# Patient Record
Sex: FEMALE | Race: WHITE | NOT HISPANIC OR LATINO | Employment: FULL TIME | URBAN - METROPOLITAN AREA
[De-identification: names, ages, dates, MRNs, and addresses within clinical notes are randomized per-mention and may not be internally consistent; named-entity substitution may affect disease eponyms.]

---

## 2017-05-29 ENCOUNTER — OFFICE VISIT (OUTPATIENT)
Dept: RETAIL CLINIC | Facility: CLINIC | Age: 37
End: 2017-05-29

## 2017-05-29 VITALS
DIASTOLIC BLOOD PRESSURE: 62 MMHG | OXYGEN SATURATION: 98 % | TEMPERATURE: 99.5 F | SYSTOLIC BLOOD PRESSURE: 112 MMHG | HEART RATE: 82 BPM | RESPIRATION RATE: 18 BRPM

## 2017-05-29 DIAGNOSIS — H10.9 CONJUNCTIVITIS OF RIGHT EYE, UNSPECIFIED CONJUNCTIVITIS TYPE: Primary | ICD-10-CM

## 2017-05-29 PROCEDURE — 99203 OFFICE O/P NEW LOW 30 MIN: CPT | Performed by: NURSE PRACTITIONER

## 2017-05-29 RX ORDER — CITALOPRAM 20 MG/1
20 TABLET ORAL DAILY
COMMUNITY
End: 2019-05-07

## 2017-05-29 RX ORDER — POLYMYXIN B SULFATE AND TRIMETHOPRIM 1; 10000 MG/ML; [USP'U]/ML
SOLUTION OPHTHALMIC
Qty: 1 EACH | Refills: 0 | Status: SHIPPED | OUTPATIENT
Start: 2017-05-29 | End: 2019-05-07

## 2019-01-07 ENCOUNTER — OFFICE VISIT (OUTPATIENT)
Dept: RETAIL CLINIC | Facility: CLINIC | Age: 39
End: 2019-01-07

## 2019-01-07 VITALS
OXYGEN SATURATION: 99 % | SYSTOLIC BLOOD PRESSURE: 125 MMHG | TEMPERATURE: 99.3 F | DIASTOLIC BLOOD PRESSURE: 90 MMHG | HEART RATE: 83 BPM

## 2019-01-07 DIAGNOSIS — J06.9 ACUTE URI: Primary | ICD-10-CM

## 2019-01-07 DIAGNOSIS — J32.9 SINUSITIS, UNSPECIFIED CHRONICITY, UNSPECIFIED LOCATION: ICD-10-CM

## 2019-01-07 PROCEDURE — 99213 OFFICE O/P EST LOW 20 MIN: CPT | Performed by: NURSE PRACTITIONER

## 2019-01-07 RX ORDER — PREDNISONE 20 MG/1
20 TABLET ORAL 2 TIMES DAILY
Qty: 10 TABLET | Refills: 0 | Status: SHIPPED | OUTPATIENT
Start: 2019-01-07 | End: 2019-05-07

## 2019-01-07 RX ORDER — BENZONATATE 100 MG/1
CAPSULE ORAL
Qty: 30 CAPSULE | Refills: 0 | Status: SHIPPED | OUTPATIENT
Start: 2019-01-07 | End: 2019-05-07

## 2019-01-07 RX ORDER — AMOXICILLIN 500 MG/1
500 CAPSULE ORAL 2 TIMES DAILY
Qty: 20 CAPSULE | Refills: 0 | Status: SHIPPED | OUTPATIENT
Start: 2019-01-07 | End: 2019-01-17

## 2019-01-07 NOTE — PATIENT INSTRUCTIONS
Enterovirus D68, Adult  Enterovirus D68 is a common virus that causes a fever, cough, and nasal congestion (upper respiratory infection).  What are the causes?  Enterovirus D68 spreads from person to person through coughing and sneezing. The virus is present in the fluid of an infected person's lungs (upper respiratory secretions). When a sick person coughs or sneezes, particles get released into the air. You can get infected by breathing in those particles. The virus may also be on any surface where these particles land. You can get infected by touching that surface and then touching your nose or mouth.  What increases the risk?  This condition is more likely to develop in:  · People who have a chronic disease, such as chronic obstructive pulmonary disease (COPD), asthma, congestive heart failure, cystic fibrosis, or diabetes.  · People who have a weakened immune system (are immunocompromised).    What are the signs or symptoms?  For most adults, symptoms of this condition are mild. Symptoms include:  · Fever.  · Nasal congestion.  · Sneezing.  · Muscle aches.  · Cough.  · Wheezing.  · Trouble breathing.    How is this diagnosed?  This condition is diagnosed based on your symptoms and a physical exam. You may also have a chest X-ray.  How is this treated?  There is no treatment or vaccine for this infection. Most people recover after resting at home for several days.  Follow these instructions at home:  · Take over-the-counter and prescription medicines only as told by your health care provider.  · Rest at home until symptoms go away. Do not go to work while you have symptoms.  · Wash your hands often with soap and water for at least 20 seconds. If soap and water are not available, use hand .  · Drink enough fluid to keep your urine clear or pale yellow.  · Keep all follow-up visits as told by your health care provider. This is important.  Contact a health care provider if:  · You have a fever.  · You have  nausea or vomiting.  · Your symptoms last longer than 3 days.  Get help right away if:  · You develop wheezing.  · You have trouble breathing.  · You cannot keep fluids down.  This information is not intended to replace advice given to you by your health care provider. Make sure you discuss any questions you have with your health care provider.  Document Released: 12/23/2014 Document Revised: 07/07/2017 Document Reviewed: 04/06/2017  [employer] Interactive Patient Education © 2018 [employer]

## 2019-01-07 NOTE — PROGRESS NOTES
Subjective:     Elinor Jauregui is a 38 y.o.     URI    This is a new problem. The current episode started in the past 7 days. Associated symptoms include congestion (yellow in am), coughing, headaches, a plugged ear sensation and rhinorrhea. Pertinent negatives include no ear pain or wheezing. Sinus pain: pressure. Sore throat: scratchy. Treatments tried: ibuprofen, sinus medication. The treatment provided no relief.         The following portions of the patient's history were reviewed and updated as appropriate: allergies, current medications, past family history, past medical history, past social history, past surgical history and problem list.      Review of Systems   HENT: Positive for congestion (yellow in am), postnasal drip, rhinorrhea and sinus pressure. Negative for ear pain. Sinus pain: pressure. Sore throat: scratchy.    Respiratory: Positive for cough. Negative for shortness of breath and wheezing.    Cardiovascular: Negative.    Neurological: Positive for headaches.         Objective:      Physical Exam   Constitutional: She appears well-developed and well-nourished.   HENT:   Head: Normocephalic and atraumatic.   Right Ear: Tympanic membrane and ear canal normal.   Left Ear: Tympanic membrane and ear canal normal.   Nose: Rhinorrhea present. Right sinus exhibits maxillary sinus tenderness. Left sinus exhibits maxillary sinus tenderness.   Mouth/Throat: Posterior oropharyngeal erythema (mild) present. No oropharyngeal exudate.   Post nasal drainage noted, nares erythematous   Eyes: Right eye exhibits no discharge and no exudate. Left eye exhibits no discharge and no exudate. Right conjunctiva is injected. Left conjunctiva is injected.   Cardiovascular: Normal rate, regular rhythm, S1 normal, S2 normal and normal heart sounds.   Pulmonary/Chest: Effort normal and breath sounds normal.   Lymphadenopathy:     She has no cervical adenopathy.   Vitals reviewed.          Elinor was seen today for sinus  problem.    Diagnoses and all orders for this visit:    Acute URI    Sinusitis, unspecified chronicity, unspecified location    Other orders  -     predniSONE (DELTASONE) 20 MG tablet; Take 1 tablet by mouth 2 (Two) Times a Day.  -     amoxicillin (AMOXIL) 500 MG capsule; Take 1 capsule by mouth 2 (Two) Times a Day for 10 days.  -     benzonatate (TESSALON PERLES) 100 MG capsule; 1 to 2 capsules 3 times a day    YOu have been prescribed a wait and see antibiotic. If symptoms worsen or persist you may take the prescribed antibiotic as directed

## 2019-05-07 ENCOUNTER — OFFICE VISIT (OUTPATIENT)
Dept: INTERNAL MEDICINE | Facility: CLINIC | Age: 39
End: 2019-05-07

## 2019-05-07 VITALS
WEIGHT: 134 LBS | HEART RATE: 83 BPM | HEIGHT: 60 IN | SYSTOLIC BLOOD PRESSURE: 160 MMHG | OXYGEN SATURATION: 97 % | DIASTOLIC BLOOD PRESSURE: 100 MMHG | BODY MASS INDEX: 26.31 KG/M2 | RESPIRATION RATE: 16 BRPM

## 2019-05-07 DIAGNOSIS — R03.0 ELEVATED BLOOD PRESSURE READING: ICD-10-CM

## 2019-05-07 DIAGNOSIS — D75.89 MACROCYTOSIS WITHOUT ANEMIA: Primary | ICD-10-CM

## 2019-05-07 PROBLEM — F41.9 ANXIETY: Status: ACTIVE | Noted: 2019-05-07

## 2019-05-07 PROBLEM — F32.A DEPRESSION: Status: ACTIVE | Noted: 2019-05-07

## 2019-05-07 PROCEDURE — 99203 OFFICE O/P NEW LOW 30 MIN: CPT | Performed by: PHYSICIAN ASSISTANT

## 2019-05-07 RX ORDER — PROPRANOLOL HYDROCHLORIDE 40 MG/1
40 TABLET ORAL 3 TIMES DAILY
COMMUNITY

## 2019-05-07 NOTE — PROGRESS NOTES
Subjective   Chief Complaint   Patient presents with   • Establish Care     pt having HTN issues       Patient is a 38-year-old white female who presents today to establish care as a new patient.  She sees a psychiatrist and her blood pressure was elevated at her office in the last week at 170/119, it was rechecked her blood pressure was found to be 156/102.  Over the next few hours she checked it at [facility] on their machine at the pharmacy and it was 160/110, 166/105 and the last check was 174/114.patient states she is asymptomatic, no chest pain, blurred vision, double vision headaches changes in her speech shortness of air or edema.  She called the nurse through her insurance company and it was advised to go to the emergency department.  She was seen at [facility] ED and had blood work and an EKG that were normal.  After being given IV Ativan her blood pressure came down to would be within normal limits.  Her labs were normal other than an MCV of 103.7.  She was not anemic.  She states she drinks 5 or 6 beers 3 to 4 days a week.  She does not drink daily.  She states she has consumed this much alcohol the majority of her adult life.  She smokes almost half a pack per day since age 21.      She has not had a Pap or pelvic exam in many years.  She had a stillborn birth of a daughter in 2011 and has not been able to make herself go back to the GYN.    Patient has been checking her blood pressure at home with a cuff that she bought at the pharmacy is been reading in the 118s over 70s.  The only time it is been elevated and when she finds herself very anxious.  Patient has no family history of hypertension.          Patient Active Problem List   Diagnosis   • Anxiety   • Depression       Allergies   Allergen Reactions   • Sinus Relief Extra Strength [Phenylephrine Hcl] Palpitations       Current Outpatient Medications on File Prior to Visit   Medication Sig Dispense Refill   • propranolol (INDERAL) 40 MG tablet Take  40 mg by mouth 3 (Three) Times a Day.     • sertraline (ZOLOFT) 50 MG tablet Take 75 mg by mouth Daily.     • [DISCONTINUED] benzonatate (TESSALON PERLES) 100 MG capsule 1 to 2 capsules 3 times a day 30 capsule 0   • [DISCONTINUED] citalopram (CeleXA) 20 MG tablet Take 20 mg by mouth Daily.     • [DISCONTINUED] predniSONE (DELTASONE) 20 MG tablet Take 1 tablet by mouth 2 (Two) Times a Day. 10 tablet 0   • [DISCONTINUED] trimethoprim-polymyxin b (POLYTRIM) 27712-4.1 UNIT/ML-% ophthalmic solution 1 drop in affected eye every three hours while awake for conjunctivitis 1 each 0     No current facility-administered medications on file prior to visit.        Past Medical History:   Diagnosis Date   • Allergic    • Anxiety    • Depression        Family History   Problem Relation Age of Onset   • ALS Father    • Cancer Maternal Grandmother        Social History     Socioeconomic History   • Marital status: Unknown     Spouse name: Not on file   • Number of children: Not on file   • Years of education: Not on file   • Highest education level: Not on file   Tobacco Use   • Smoking status: Current Some Day Smoker     Years: 17.00     Types: Cigarettes   • Smokeless tobacco: Never Used   Substance and Sexual Activity   • Alcohol use: Yes   • Drug use: No       Past Surgical History:   Procedure Laterality Date   • WISDOM TOOTH EXTRACTION         PHQ-9 Depression Screening  Little interest or pleasure in doing things?     Feeling down, depressed, or hopeless?     Trouble falling or staying asleep, or sleeping too much?     Feeling tired or having little energy?     Poor appetite or overeating?     Feeling bad about yourself - or that you are a failure or have let yourself or your family down?     Trouble concentrating on things, such as reading the newspaper or watching television?     Moving or speaking so slowly that other people could have noticed? Or the opposite - being so fidgety or restless that you have been moving  "around a lot more than usual?     Thoughts that you would be better off dead, or of hurting yourself in some way?     PHQ-9 Total Score     If you checked off any problems, how difficult have these problems made it for you to do your work, take care of things at home, or get along with other people?       The following portions of the patient's history were reviewed and updated as appropriate: problem list, allergies, current medications, past medical history, past family history, past social history and past surgical history.    Review of Systems   Eyes: Negative for blurred vision and double vision.   Cardiovascular: Negative for chest pain and dyspnea on exertion.   Psychiatric/Behavioral: The patient is nervous/anxious.          There is no immunization history on file for this patient.    Objective   Vitals:    05/07/19 1412 05/07/19 1510   BP:  160/100   Pulse: 83    Resp: 16    SpO2: 97%    Weight: 60.8 kg (134 lb)    Height: 152.4 cm (60\")      Physical Exam   Constitutional: She appears well-developed and well-nourished.   Cardiovascular: Normal rate, regular rhythm and normal heart sounds.   Pulmonary/Chest: Effort normal and breath sounds normal.   Abdominal: Soft. Bowel sounds are normal.   No hepatosplenomegaly.   Vitals reviewed.      Assessment/Plan   Elinor was seen today for establish care.    Diagnoses and all orders for this visit:    Macrocytosis without anemia  -     Vitamin B12  -     Folate  -     CBC & Differential  -     Comprehensive Metabolic Panel    Elevated blood pressure reading    Would like her to check her blood pressure twice a day and keep a log and bring it in in 3 weeks.  I suspect this is all going to be anxiety related.  She does have macrocytosis with a MCV of 103 on blood work from the ER.  No history of inflammatory bowel disease IBS or celiac disease.  I will get a B12 and folate and repeat her CBC as well as get a CMP to assess her liver enzymes.  I suspect this is can " be secondary to alcohol consumption and I have talked to the patient about this I would like her to decrease the amount of alcohol intake she has we will reassess at next office visit.    Return in about 3 weeks (around 5/28/2019).

## 2019-05-08 LAB
ALBUMIN SERPL-MCNC: 4.8 G/DL (ref 3.5–5.2)
ALBUMIN/GLOB SERPL: 1.8 G/DL
ALP SERPL-CCNC: 66 U/L (ref 39–117)
ALT SERPL-CCNC: 33 U/L (ref 1–33)
AST SERPL-CCNC: 30 U/L (ref 1–32)
BASOPHILS # BLD AUTO: (no result) 10*3/UL
BASOPHILS # BLD MANUAL: 0.06 10*3/MM3 (ref 0–0.2)
BASOPHILS NFR BLD MANUAL: 1 % (ref 0–1.5)
BILIRUB SERPL-MCNC: 0.8 MG/DL (ref 0.2–1.2)
BUN SERPL-MCNC: 13 MG/DL (ref 6–20)
BUN/CREAT SERPL: 28.9 (ref 7–25)
CALCIUM SERPL-MCNC: 9.8 MG/DL (ref 8.6–10.5)
CHLORIDE SERPL-SCNC: 98 MMOL/L (ref 98–107)
CO2 SERPL-SCNC: 25.9 MMOL/L (ref 22–29)
CREAT SERPL-MCNC: 0.45 MG/DL (ref 0.57–1)
DIFFERENTIAL COMMENT: NORMAL
EOSINOPHIL # BLD AUTO: (no result) 10*3/UL
EOSINOPHIL # BLD MANUAL: 0.13 10*3/MM3 (ref 0–0.4)
EOSINOPHIL NFR BLD AUTO: (no result) %
EOSINOPHIL NFR BLD MANUAL: 2 % (ref 0.3–6.2)
ERYTHROCYTE [DISTWIDTH] IN BLOOD BY AUTOMATED COUNT: 12.3 % (ref 12.3–15.4)
FOLATE SERPL-MCNC: 12.5 NG/ML (ref 4.78–24.2)
GLOBULIN SER CALC-MCNC: 2.6 GM/DL
GLUCOSE SERPL-MCNC: 79 MG/DL (ref 65–99)
HCT VFR BLD AUTO: 39.9 % (ref 34–46.6)
HGB BLD-MCNC: 13 G/DL (ref 12–15.9)
LYMPHOCYTES # BLD AUTO: (no result) 10*3/UL
LYMPHOCYTES # BLD MANUAL: 1.58 10*3/MM3 (ref 0.7–3.1)
LYMPHOCYTES NFR BLD AUTO: (no result) %
LYMPHOCYTES NFR BLD MANUAL: 24.5 % (ref 19.6–45.3)
MCH RBC QN AUTO: 34.9 PG (ref 26.6–33)
MCHC RBC AUTO-ENTMCNC: 32.6 G/DL (ref 31.5–35.7)
MCV RBC AUTO: 107 FL (ref 79–97)
MONOCYTES # BLD MANUAL: 0.46 10*3/MM3 (ref 0.1–0.9)
MONOCYTES NFR BLD AUTO: (no result) %
MONOCYTES NFR BLD MANUAL: 7.1 % (ref 5–12)
NEUTROPHILS # BLD MANUAL: 4.2 10*3/MM3 (ref 1.7–7)
NEUTROPHILS NFR BLD AUTO: (no result) %
NEUTROPHILS NFR BLD MANUAL: 65.3 % (ref 42.7–76)
PLATELET # BLD AUTO: 209 10*3/MM3 (ref 140–450)
PLATELET BLD QL SMEAR: NORMAL
POTASSIUM SERPL-SCNC: 4.4 MMOL/L (ref 3.5–5.2)
PROT SERPL-MCNC: 7.4 G/DL (ref 6–8.5)
RBC # BLD AUTO: 3.73 10*6/MM3 (ref 3.77–5.28)
RBC MORPH BLD: NORMAL
SODIUM SERPL-SCNC: 139 MMOL/L (ref 136–145)
VIT B12 SERPL-MCNC: 900 PG/ML (ref 211–946)
WBC # BLD AUTO: 6.43 10*3/MM3 (ref 3.4–10.8)